# Patient Record
Sex: FEMALE | Race: ASIAN | Employment: UNEMPLOYED | URBAN - METROPOLITAN AREA
[De-identification: names, ages, dates, MRNs, and addresses within clinical notes are randomized per-mention and may not be internally consistent; named-entity substitution may affect disease eponyms.]

---

## 2017-05-08 ENCOUNTER — OFFICE VISIT (OUTPATIENT)
Dept: INTERNAL MEDICINE | Facility: CLINIC | Age: 42
End: 2017-05-08
Payer: COMMERCIAL

## 2017-05-08 VITALS
WEIGHT: 137.5 LBS | TEMPERATURE: 98.7 F | HEART RATE: 77 BPM | OXYGEN SATURATION: 99 % | BODY MASS INDEX: 22.1 KG/M2 | SYSTOLIC BLOOD PRESSURE: 102 MMHG | HEIGHT: 66 IN | DIASTOLIC BLOOD PRESSURE: 60 MMHG

## 2017-05-08 DIAGNOSIS — R21 RASH: Primary | ICD-10-CM

## 2017-05-08 PROCEDURE — 99213 OFFICE O/P EST LOW 20 MIN: CPT | Performed by: INTERNAL MEDICINE

## 2017-05-08 RX ORDER — FAMOTIDINE 20 MG/1
20 TABLET, FILM COATED ORAL 2 TIMES DAILY
Qty: 14 TABLET | Refills: 0 | Status: SHIPPED | OUTPATIENT
Start: 2017-05-08 | End: 2017-11-07

## 2017-05-08 RX ORDER — PREDNISONE 20 MG/1
40 TABLET ORAL DAILY
Qty: 14 TABLET | Refills: 0 | Status: SHIPPED | OUTPATIENT
Start: 2017-05-08 | End: 2017-05-15

## 2017-05-08 RX ORDER — LORATADINE 10 MG/1
10 TABLET ORAL DAILY
Qty: 14 TABLET | Refills: 0 | Status: SHIPPED | OUTPATIENT
Start: 2017-05-08 | End: 2017-11-07

## 2017-05-08 NOTE — PATIENT INSTRUCTIONS
Prednisone two pills (taken together) for 7 days.    Pepcid twice a day for 7 days.    Claritin daily for 7 days.    Tylenol 3x/day for 7 days.    If rash worsens, changes, or doesn't improve, call me!

## 2017-05-08 NOTE — NURSING NOTE
"Chief Complaint   Patient presents with     Derm Problem     x 1 week. Bilateral legs with itching and rash.        Initial /60 (BP Location: Left arm, Patient Position: Chair, Cuff Size: Adult Regular)  Pulse 77  Temp 98.7  F (37.1  C) (Oral)  Ht 5' 6\" (1.676 m)  Wt 137 lb 8 oz (62.4 kg)  LMP 04/30/2017 (Exact Date)  SpO2 99%  Breastfeeding? No  BMI 22.19 kg/m2 Estimated body mass index is 22.19 kg/(m^2) as calculated from the following:    Height as of this encounter: 5' 6\" (1.676 m).    Weight as of this encounter: 137 lb 8 oz (62.4 kg).  Medication Reconciliation: complete       Kaminibose MA      "

## 2017-05-08 NOTE — MR AVS SNAPSHOT
"              After Visit Summary   5/8/2017    Anu Fernández    MRN: [id number]           Patient Information     Date Of Birth          1975        Visit Information        Provider Department      5/8/2017 10:00 AM Bijal Sultana MD [facility]        Today's Diagnoses     Rash    -  1      Care Instructions    Prednisone two pills (taken together) for 7 days.    Pepcid twice a day for 7 days.    Claritin daily for 7 days.    Tylenol 3x/day for 7 days.    If rash worsens, changes, or doesn't improve, call me!        Follow-ups after your visit        Who to contact     If you have questions or need follow up information about today's clinic visit or your schedule please contact [facility] directly at [phone].  Normal or non-critical lab and imaging results will be communicated to you by [employer], letter or phone within 4 business days after the clinic has received the results. If you do not hear from us within 7 days, please contact the clinic through MyChart or phone. If you have a critical or abnormal lab result, we will notify you by phone as soon as possible.  Submit refill requests through [employer] or call your pharmacy and they will forward the refill request to us. Please allow 3 business days for your refill to be completed.          Additional Information About Your Visit        MyChart Information     [employer] lets you send messages to your doctor, view your test results, renew your prescriptions, schedule appointments and more. To sign up, go to [email or web address] . Click on \"Log in\" on the left side of the screen, which will take you to the Welcome page. Then click on \"Sign up Now\" on the right side of the page.     You will be asked to enter the access code listed below, as well as some personal information. Please follow the directions to create your username and password.     Your access code is: [email or web address]  Expires: 8/6/2017 " "10:25 AM     Your access code will  in 90 days. If you need help or a new code, please call your [facility] or [phone].        Care EveryWhere ID     This is your Care EveryWhere ID. This could be used by other organizations to access your [facility] medical records  [id number]        Your Vitals Were     Pulse Temperature Height Last Period Pulse Oximetry Breastfeeding?    77 98.7  F (37.1  C) (Oral) 5' 6\" (1.676 m) 2017 (Exact Date) 99% No    BMI (Body Mass Index)                   22.19 kg/m2            Blood Pressure from Last 3 Encounters:   17 102/60   16 100/60   10/05/15 110/70    Weight from Last 3 Encounters:   17 137 lb 8 oz (62.4 kg)   16 133 lb 11.2 oz (60.6 kg)   10/05/15 124 lb (56.2 kg)              Today, you had the following     No orders found for display         Today's Medication Changes          These changes are accurate as of: 17 10:25 AM.  If you have any questions, ask your nurse or doctor.               Start taking these medicines.        Dose/Directions    famotidine 20 MG tablet   Commonly known as:  PEPCID   Used for:  Rash   Started by:  iBjal Sultana MD        Dose:  20 mg   Take 1 tablet (20 mg) by mouth 2 times daily   Quantity:  14 tablet   Refills:  0       loratadine 10 MG tablet   Commonly known as:  CLARITIN   Used for:  Rash   Started by:  Bijal Sultana MD        Dose:  10 mg   Take 1 tablet (10 mg) by mouth daily   Quantity:  14 tablet   Refills:  0       predniSONE 20 MG tablet   Commonly known as:  DELTASONE   Used for:  Rash   Started by:  Bijal Sultana MD        Dose:  40 mg   Take 2 tablets (40 mg) by mouth daily for 7 days   Quantity:  14 tablet   Refills:  0            Where to get your medicines      These medications were sent to [facility] [address] [address]     Phone:  [phone]     famotidine 20 MG tablet    loratadine 10 " MG tablet    predniSONE 20 MG tablet                Primary Care Provider    None Specified       No primary provider on file.        Thank you!     Thank you for choosing [facility]  for your care. Our goal is always to provide you with excellent care. Hearing back from our patients is one way we can continue to improve our services. Please take a few minutes to complete the written survey that you may receive in the mail after your visit with us. Thank you!             Your Updated Medication List - Protect others around you: Learn how to safely use, store and throw away your medicines at www.disposemymeds.org.          This list is accurate as of: 5/8/17 10:25 AM.  Always use your most recent med list.                   Brand Name Dispense Instructions for use    famotidine 20 MG tablet    PEPCID    14 tablet    Take 1 tablet (20 mg) by mouth 2 times daily       loratadine 10 MG tablet    CLARITIN    14 tablet    Take 1 tablet (10 mg) by mouth daily       predniSONE 20 MG tablet    DELTASONE    14 tablet    Take 2 tablets (40 mg) by mouth daily for 7 days

## 2017-05-08 NOTE — PROGRESS NOTES
"  SUBJECTIVE:                                                      HPI: Anu Fernández is a pleasant 41 year old female who presents with rash on her legs:    - started several days ago  - started inner left thigh  - has now spread throughout inner left and right legs (thigh and lower legs)  - has started developing a rash on posterior wrists  - very itchy  - tried using Benadryl, just made her fall sleep    No new soaps, detergents, or lotions.    No new medications - prescription or over-the-counter.    No recent travel, yard work/gardening, hiking, or outdoor exposure.    No one else in home with similar symptoms.    The medication, allergy, and problem lists have been reviewed and updated as appropriate.       OBJECTIVE:                                                      /60 (BP Location: Left arm, Patient Position: Chair, Cuff Size: Adult Regular)  Pulse 77  Temp 98.7  F (37.1  C) (Oral)  Ht 5' 6\" (1.676 m)  Wt 137 lb 8 oz (62.4 kg)  LMP 04/30/2017 (Exact Date)  SpO2 99%  Breastfeeding? No  BMI 22.19 kg/m2  Constitutional: well-appearing  Integumentary:   - dozens of flesh-colored papules, many with overlying excoriations and scabs, along inner thigh and medial lower legs   - several small erythematous papules on posterior wrists    ASSESSMENT/PLAN:                                                      (R21) Rash  (primary encounter diagnosis)  Comment:    - etiology unclear, but suspect allergic reaction to something topical (as opposed to something ingested).    - due to significant area involved, recommend oral prednisone over topical steroids for symptomatic relief.  Plan:    - prednisone 40 mg daily ×7 days.    - Claritin daily ×7 days.    - Pepcid b.i.d. ×7 days.    - Tylenol t.i.d. ×7 days.    - if rash worsens, changes, or does not improve, patient to contact M.D.     The instructions on the AVS were discussed and explained to the patient. Patient expressed understanding of " instructions.    Bijal Sultana MD   [place] [address] [address] [address]  T: [phone], F: [phone]

## 2017-11-07 ENCOUNTER — OFFICE VISIT (OUTPATIENT)
Dept: INTERNAL MEDICINE | Facility: CLINIC | Age: 42
End: 2017-11-07
Payer: COMMERCIAL

## 2017-11-07 VITALS
OXYGEN SATURATION: 96 % | DIASTOLIC BLOOD PRESSURE: 72 MMHG | SYSTOLIC BLOOD PRESSURE: 114 MMHG | BODY MASS INDEX: 22.5 KG/M2 | TEMPERATURE: 98.7 F | WEIGHT: 140 LBS | HEART RATE: 110 BPM | HEIGHT: 66 IN

## 2017-11-07 DIAGNOSIS — J06.9 UPPER RESPIRATORY TRACT INFECTION, UNSPECIFIED TYPE: Primary | ICD-10-CM

## 2017-11-07 PROCEDURE — 99213 OFFICE O/P EST LOW 20 MIN: CPT | Performed by: PHYSICIAN ASSISTANT

## 2017-11-07 RX ORDER — CODEINE PHOSPHATE AND GUAIFENESIN 10; 100 MG/5ML; MG/5ML
1-2 SOLUTION ORAL
Qty: 180 ML | Refills: 0 | Status: SHIPPED | OUTPATIENT
Start: 2017-11-07

## 2017-11-07 RX ORDER — BENZONATATE 200 MG/1
200 CAPSULE ORAL 3 TIMES DAILY PRN
Qty: 21 CAPSULE | Refills: 0 | Status: SHIPPED | OUTPATIENT
Start: 2017-11-07

## 2017-11-07 NOTE — MR AVS SNAPSHOT
"              After Visit Summary   2017    Anu Fernández    MRN: [id number]           Patient Information     Date Of Birth          1975        Visit Information        Provider Department      2017 6:00 PM Patricia Arriaga PA-C [facility]        Today's Diagnoses     Upper respiratory tract infection, unspecified type    -  1       Follow-ups after your visit        Who to contact     If you have questions or need follow up information about today's clinic visit or your schedule please contact [facility] directly at [phone].  Normal or non-critical lab and imaging results will be communicated to you by MyChart, letter or phone within 4 business days after the clinic has received the results. If you do not hear from us within 7 days, please contact the clinic through [employer] or phone. If you have a critical or abnormal lab result, we will notify you by phone as soon as possible.  Submit refill requests through "[employer] or call your pharmacy and they will forward the refill request to us. Please allow 3 business days for your refill to be completed.          Additional Information About Your Visit        MyChart Information     [employer] lets you send messages to your doctor, view your test results, renew your prescriptions, schedule appointments and more. To sign up, go to [email or web address] . Click on \"Log in\" on the left side of the screen, which will take you to the Welcome page. Then click on \"Sign up Now\" on the right side of the page.     You will be asked to enter the access code listed below, as well as some personal information. Please follow the directions to create your username and password.     Your access code is: [email or web address]  Expires: 2018  6:13 PM     Your access code will  in 90 days. If you need help or a new code, please call your [facility] or [phone].        Care EveryWhere ID     This is " "your Care EveryWhere ID. This could be used by other organizations to access your [facility] medical records  [id number]        Your Vitals Were     Pulse Temperature Height Pulse Oximetry BMI (Body Mass Index)       110 98.7  F (37.1  C) (Oral) 5' 6\" (1.676 m) 96% 22.6 kg/m2        Blood Pressure from Last 3 Encounters:   11/07/17 114/72   05/08/17 102/60   06/06/16 100/60    Weight from Last 3 Encounters:   11/07/17 140 lb (63.5 kg)   05/08/17 137 lb 8 oz (62.4 kg)   06/06/16 133 lb 11.2 oz (60.6 kg)              Today, you had the following     No orders found for display         Today's Medication Changes          These changes are accurate as of: 11/7/17  6:13 PM.  If you have any questions, ask your nurse or doctor.               Start taking these medicines.        Dose/Directions    benzonatate 200 MG capsule   Commonly known as:  TESSALON   Used for:  Upper respiratory tract infection, unspecified type   Started by:  Patricia Arriaga PA-C        Dose:  200 mg   Take 1 capsule (200 mg) by mouth 3 times daily as needed for cough   Quantity:  21 capsule   Refills:  0       guaiFENesin-codeine 100-10 MG/5ML Soln solution   Commonly known as:  ROBITUSSIN AC   Used for:  Upper respiratory tract infection, unspecified type   Started by:  Patricia Arriaga PA-C        Dose:  1-2 tsp.   Take 5-10 mLs by mouth nightly as needed for cough   Quantity:  180 mL   Refills:  0         Stop taking these medicines if you haven't already. Please contact your care team if you have questions.     famotidine 20 MG tablet   Commonly known as:  PEPCID   Stopped by:  Patricia Arriaga PA-C           loratadine 10 MG tablet   Commonly known as:  CLARITIN   Stopped by:  Patricia Arriaga PA-C                Where to get your medicines      These medications were sent to [facility] [address] [place], [place] - [address] AT [facility] [address]  [address], [facility] [address]     Phone:  " [phone]     benzonatate 200 MG capsule         Some of these will need a paper prescription and others can be bought over the counter.  Ask your nurse if you have questions.     Bring a paper prescription for each of these medications     guaiFENesin-codeine 100-10 MG/5ML Soln solution                Primary Care Provider Office Phone # Fax #    Bijal Sultana [phone]       [address] [facility] [address]        Equal Access to Services     DELVIN MONDRAGON : Hadii aad ku hadasho Soomaali, waaxda luqadaha, qaybta kaalmada adeegyada, waxay idiin hayaan adeeg kharash la'betzaida . So [facility] [phone].    ATENCIÓN: Si habla español, tiene a hollins disposición servicios gratuitos de asistencia lingüística. [facility] [phone].    We comply with applicable federal civil rights laws and [place] laws. We do not discriminate on the basis of race, color, national origin, age, disability, sex, sexual orientation, or gender identity.            Thank you!     Thank you for choosing [facility]  for your care. Our goal is always to provide you with excellent care. Hearing back from our patients is one way we can continue to improve our services. Please take a few minutes to complete the written survey that you may receive in the mail after your visit with us. Thank you!             Your Updated Medication List - Protect others around you: Learn how to safely use, store and throw away your medicines at www.disposemymeds.org.          This list is accurate as of: 11/7/17  6:13 PM.  Always use your most recent med list.                   Brand Name Dispense Instructions for use Diagnosis    benzonatate 200 MG capsule    TESSALON    21 capsule    Take 1 capsule (200 mg) by mouth 3 times daily as needed for cough    Upper respiratory tract infection, unspecified type       guaiFENesin-codeine 100-10 MG/5ML Soln solution    ROBITUSSIN AC    180 mL    Take 5-10 mLs by mouth nightly as  needed for cough    Upper respiratory tract infection, unspecified type

## 2017-11-07 NOTE — PROGRESS NOTES
"  SUBJECTIVE:   Anu Fenrández is a 42 year old female who presents to clinic today for the following health issues:      Concern - cough   Onset: x1 wk     Description:   Pt states she has had a dry cough x1 wk and is worse at night.     Intensity: severe    Progression of Symptoms:  worsening    Accompanying Signs & Symptoms:    No fever or chills.    Runny nose noted.     Congestion noted nasal         Previous history of similar problem:   Yes-gets it every year     Precipitating factors:   Worsened by: laying down     Alleviating factors:  Improved by: nothing     Therapies Tried and outcome: cold and cough at night with some relief and lozenges.         -------------------------------------    Problem list and histories reviewed & adjusted, as indicated.  Additional history: as documented    Labs reviewed in EPIC    Reviewed and updated as needed this visit by clinical staffTobacco  Allergies       Reviewed and updated as needed this visit by Provider  Allergies  Meds         ROS:  Constitutional, HEENT, cardiovascular, pulmonary, gi and gu systems are negative, except as otherwise noted.      OBJECTIVE:   /72 (BP Location: Left arm, Patient Position: Chair, Cuff Size: Adult Regular)  Pulse 110  Temp 98.7  F (37.1  C) (Oral)  Ht 5' 6\" (1.676 m)  Wt 140 lb (63.5 kg)  SpO2 96%  BMI 22.6 kg/m2  Body mass index is 22.6 kg/(m^2).  GENERAL: healthy, alert and no distress  HENT: normal cephalic/atraumatic, ear canals and TM's normal, nose and mouth without ulcers or lesions and oropharynx clear  NECK: no adenopathy and no asymmetry, masses, or scars  RESP: lungs clear to auscultation - no rales, rhonchi or wheezes  CV: regular rates and rhythm and normal S1 S2, no S3 or S4  MS: extremities normal- no gross deformities noted  SKIN: no suspicious lesions or rashes    Diagnostic Test Results:  none     ASSESSMENT/PLAN:             1. Upper respiratory tract infection, unspecified type    - benzonatate " (TESSALON) 200 MG capsule; Take 1 capsule (200 mg) by mouth 3 times daily as needed for cough  Dispense: 21 capsule; Refill: 0  - guaiFENesin-codeine (ROBITUSSIN AC) 100-10 MG/5ML SOLN solution; Take 5-10 mLs by mouth nightly as needed for cough  Dispense: 180 mL; Refill: 0    Fluids rest and monitor     Patricia Arriaga PA-C  [facility]

## 2023-04-18 ENCOUNTER — TRANSCRIBE ORDERS (OUTPATIENT)
Dept: OTHER | Age: 48
End: 2023-04-18

## 2023-04-18 DIAGNOSIS — G43.809 OTHER MIGRAINE WITHOUT STATUS MIGRAINOSUS, NOT INTRACTABLE: ICD-10-CM

## 2023-04-18 DIAGNOSIS — R68.89 FORGETFULNESS: Primary | ICD-10-CM

## 2025-05-19 NOTE — NURSING NOTE
"Chief Complaint   Patient presents with     Cough     x1 wk        Initial /72 (BP Location: Left arm, Patient Position: Chair, Cuff Size: Adult Regular)  Pulse 110  Temp 98.7  F (37.1  C) (Oral)  Ht 5' 6\" (1.676 m)  Wt 140 lb (63.5 kg)  SpO2 96%  BMI 22.6 kg/m2 Estimated body mass index is 22.6 kg/(m^2) as calculated from the following:    Height as of this encounter: 5' 6\" (1.676 m).    Weight as of this encounter: 140 lb (63.5 kg).  Medication Reconciliation: complete    " Thank you for allowing us to participate in your patient's care. Please see office visit note for continued care. Please feel free to contact us with any questions.  Tobi Rowe MD Sports Medicine